# Patient Record
Sex: MALE | Race: BLACK OR AFRICAN AMERICAN | NOT HISPANIC OR LATINO | URBAN - METROPOLITAN AREA
[De-identification: names, ages, dates, MRNs, and addresses within clinical notes are randomized per-mention and may not be internally consistent; named-entity substitution may affect disease eponyms.]

---

## 2018-08-21 ENCOUNTER — OUTPATIENT (OUTPATIENT)
Dept: OUTPATIENT SERVICES | Facility: HOSPITAL | Age: 33
LOS: 1 days | End: 2018-08-21
Payer: COMMERCIAL

## 2018-08-21 ENCOUNTER — APPOINTMENT (OUTPATIENT)
Dept: FAMILY MEDICINE | Facility: HOSPITAL | Age: 33
End: 2018-08-21
Payer: COMMERCIAL

## 2018-08-21 DIAGNOSIS — Z83.3 FAMILY HISTORY OF DIABETES MELLITUS: ICD-10-CM

## 2018-08-21 DIAGNOSIS — F17.210 NICOTINE DEPENDENCE, CIGARETTES, UNCOMPLICATED: ICD-10-CM

## 2018-08-21 DIAGNOSIS — Z78.9 OTHER SPECIFIED HEALTH STATUS: ICD-10-CM

## 2018-08-21 DIAGNOSIS — Z87.891 PERSONAL HISTORY OF NICOTINE DEPENDENCE: ICD-10-CM
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
## 2018-08-21 DIAGNOSIS — F17.200 NICOTINE DEPENDENCE, UNSPECIFIED, UNCOMPLICATED: ICD-10-CM

## 2018-08-21 DIAGNOSIS — M25.511 PAIN IN RIGHT SHOULDER: ICD-10-CM

## 2018-08-21 DIAGNOSIS — Z00.00 ENCOUNTER FOR GENERAL ADULT MEDICAL EXAMINATION WITHOUT ABNORMAL FINDINGS: ICD-10-CM

## 2018-08-21 RX ORDER — NAPROXEN 500 MG/1
500 TABLET ORAL
Qty: 20 | Refills: 0 | Status: ACTIVE | COMMUNITY
Start: 2018-08-21 | End: 1900-01-01

## 2018-08-21 RX ORDER — CYCLOBENZAPRINE HYDROCHLORIDE 5 MG/1
5 TABLET, FILM COATED ORAL
Qty: 10 | Refills: 0 | Status: ACTIVE | COMMUNITY
Start: 2018-08-21 | End: 1900-01-01

## 2018-08-21 NOTE — PHYSICAL EXAM
[No Acute Distress] : no acute distress [Well Nourished] : well nourished [Normal Outer Ear/Nose] : the outer ears and nose were normal in appearance [No JVD] : no jugular venous distention [Supple] : supple [No Respiratory Distress] : no respiratory distress  [Clear to Auscultation] : lungs were clear to auscultation bilaterally [No Accessory Muscle Use] : no accessory muscle use [Normal Rate] : normal rate  [Regular Rhythm] : with a regular rhythm [Normal S1, S2] : normal S1 and S2 [No Murmur] : no murmur heard [Pedal Pulses Present] : the pedal pulses are present [No Edema] : there was no peripheral edema [Soft] : abdomen soft [Non Tender] : non-tender [Non-distended] : non-distended [No Masses] : no abdominal mass palpated [No HSM] : no HSM [Normal Bowel Sounds] : normal bowel sounds [No CVA Tenderness] : no CVA  tenderness [No Spinal Tenderness] : no spinal tenderness [No Rash] : no rash [Normal Gait] : normal gait [Normal Affect] : the affect was normal [Normal Insight/Judgement] : insight and judgment were intact [de-identified] : Anterior right shoulder tenderness over coracoid process. Tenderness at insertion point of right deltoid and over scapula. Full range of motion (however with pain at extreme ranges). Normal strength, normal upper extremity pulses and capillary refill. Otherwise normal musculoskeletal exam.
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
## 2018-08-21 NOTE — HEALTH RISK ASSESSMENT
[] : Yes [No falls in past year] : Patient reported no falls in the past year [0] : 2) Feeling down, depressed, or hopeless: Not at all (0)
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
## 2018-08-22 DIAGNOSIS — M25.511 PAIN IN RIGHT SHOULDER: ICD-10-CM

## 2018-08-23 ENCOUNTER — FORM ENCOUNTER (OUTPATIENT)
Age: 33
End: 2018-08-23

## 2018-08-24 PROCEDURE — 83036 HEMOGLOBIN GLYCOSYLATED A1C: CPT

## 2018-08-24 PROCEDURE — G0463: CPT

## 2018-08-24 PROCEDURE — 73030 X-RAY EXAM OF SHOULDER: CPT

## 2018-08-24 PROCEDURE — 80048 BASIC METABOLIC PNL TOTAL CA: CPT

## 2018-08-24 PROCEDURE — 87389 HIV-1 AG W/HIV-1&-2 AB AG IA: CPT

## 2018-08-24 PROCEDURE — 73030 X-RAY EXAM OF SHOULDER: CPT | Mod: 26,RT

## 2018-08-27 LAB
ANION GAP SERPL CALC-SCNC: 14 MMOL/L
BASOPHILS # BLD AUTO: 0.04 K/UL
BASOPHILS NFR BLD AUTO: 0.4 %
BUN SERPL-MCNC: 12 MG/DL
CALCIUM SERPL-MCNC: 10.5 MG/DL
CHLORIDE SERPL-SCNC: 102 MMOL/L
CO2 SERPL-SCNC: 24 MMOL/L
CREAT SERPL-MCNC: 1.23 MG/DL
EOSINOPHIL # BLD AUTO: 0.15 K/UL
EOSINOPHIL NFR BLD AUTO: 1.7 %
GLUCOSE SERPL-MCNC: 82 MG/DL
HBA1C MFR BLD HPLC: 5.7 %
HCT VFR BLD CALC: 46.7 %
HGB BLD-MCNC: 14.9 G/DL
HIV1+2 AB SPEC QL IA.RAPID: NONREACTIVE
IMM GRANULOCYTES NFR BLD AUTO: 0.2 %
LYMPHOCYTES # BLD AUTO: 2.17 K/UL
LYMPHOCYTES NFR BLD AUTO: 24.3 %
MAN DIFF?: NORMAL
MCHC RBC-ENTMCNC: 28.9 PG
MCHC RBC-ENTMCNC: 31.9 GM/DL
MCV RBC AUTO: 90.5 FL
MONOCYTES # BLD AUTO: 0.8 K/UL
MONOCYTES NFR BLD AUTO: 8.9 %
NEUTROPHILS # BLD AUTO: 5.76 K/UL
NEUTROPHILS NFR BLD AUTO: 64.5 %
PLATELET # BLD AUTO: 318 K/UL
POTASSIUM SERPL-SCNC: 4.3 MMOL/L
RBC # BLD: 5.16 M/UL
RBC # FLD: 14 %
SODIUM SERPL-SCNC: 140 MMOL/L
WBC # FLD AUTO: 8.94 K/UL

## 2019-01-16 ENCOUNTER — OUTPATIENT (OUTPATIENT)
Dept: OUTPATIENT SERVICES | Facility: HOSPITAL | Age: 34
LOS: 1 days | End: 2019-01-16
Payer: COMMERCIAL

## 2019-01-16 ENCOUNTER — APPOINTMENT (OUTPATIENT)
Dept: FAMILY MEDICINE | Facility: HOSPITAL | Age: 34
End: 2019-01-16

## 2019-01-16 VITALS
HEART RATE: 69 BPM | OXYGEN SATURATION: 98 % | WEIGHT: 179 LBS | TEMPERATURE: 97.5 F | RESPIRATION RATE: 16 BRPM | HEIGHT: 75.5 IN | SYSTOLIC BLOOD PRESSURE: 129 MMHG | BODY MASS INDEX: 22.02 KG/M2 | DIASTOLIC BLOOD PRESSURE: 85 MMHG

## 2019-01-16 DIAGNOSIS — Z00.00 ENCOUNTER FOR GENERAL ADULT MEDICAL EXAMINATION WITHOUT ABNORMAL FINDINGS: ICD-10-CM

## 2019-01-16 DIAGNOSIS — H61.23 IMPACTED CERUMEN, BILATERAL: ICD-10-CM

## 2019-01-16 DIAGNOSIS — Z87.09 PERSONAL HISTORY OF OTHER DISEASES OF THE RESPIRATORY SYSTEM: ICD-10-CM

## 2019-01-16 PROCEDURE — G0463: CPT

## 2019-01-16 RX ORDER — ASPIRIN 81 MG
6.5 TABLET, DELAYED RELEASE (ENTERIC COATED) ORAL
Qty: 1 | Refills: 0 | Status: ACTIVE | COMMUNITY
Start: 2019-01-16 | End: 1900-01-01

## 2019-01-16 NOTE — HISTORY OF PRESENT ILLNESS
[FreeTextEntry8] : 33M complaining of ear/throat pain. Sick contact about 2 weeks ago. ~1 week ago started with a non-productive cough, followed by b/l ear pain. + mucus production mostly clear, now throat starting to hurt. Denies fever/chills. Reports a smoothie he drank earlier made him feel better. Pt took Airborne last week and this week.

## 2019-01-16 NOTE — PHYSICAL EXAM
[No Acute Distress] : no acute distress [Well Nourished] : well nourished [Well Developed] : well developed [Well-Appearing] : well-appearing [Normal Outer Ear/Nose] : the outer ears and nose were normal in appearance [Normal Oropharynx] : the oropharynx was normal [Normal Nasal Mucosa] : the nasal mucosa was normal [No Lymphadenopathy] : no lymphadenopathy [Thyroid Normal, No Nodules] : the thyroid was normal and there were no nodules present [No Respiratory Distress] : no respiratory distress  [Clear to Auscultation] : lungs were clear to auscultation bilaterally [No Accessory Muscle Use] : no accessory muscle use [Normal Rate] : normal rate  [Regular Rhythm] : with a regular rhythm [Normal S1, S2] : normal S1 and S2 [No Murmur] : no murmur heard [de-identified] : + extensive impacted cerumen b/l

## 2019-01-16 NOTE — ASSESSMENT
[FreeTextEntry1] : 33M comes in for URI.\par \par 1. URI\par Symptomatic treatment - drink tea w/ honey, chicken soup, take theraflu PRN\par \par 2. Impacted cerumen b/l\par Given debrox drops \par \par RTC in 10-14 days for cerumen disimpaction\par \par Discussed w/ Dr. Falcon

## 2019-01-17 DIAGNOSIS — H61.23 IMPACTED CERUMEN, BILATERAL: ICD-10-CM

## 2019-04-02 ENCOUNTER — TRANSCRIPTION ENCOUNTER (OUTPATIENT)
Age: 34
End: 2019-04-02

## 2019-04-09 ENCOUNTER — TRANSCRIPTION ENCOUNTER (OUTPATIENT)
Age: 34
End: 2019-04-09

## 2019-07-19 ENCOUNTER — EMERGENCY (EMERGENCY)
Facility: HOSPITAL | Age: 34
LOS: 1 days | Discharge: HOME | End: 2019-07-19
Attending: EMERGENCY MEDICINE | Admitting: EMERGENCY MEDICINE
Payer: SELF-PAY

## 2019-07-19 VITALS
TEMPERATURE: 100 F | DIASTOLIC BLOOD PRESSURE: 70 MMHG | OXYGEN SATURATION: 99 % | WEIGHT: 184.97 LBS | RESPIRATION RATE: 17 BRPM | SYSTOLIC BLOOD PRESSURE: 110 MMHG | HEART RATE: 99 BPM

## 2019-07-19 PROCEDURE — 73620 X-RAY EXAM OF FOOT: CPT | Mod: 26,LT

## 2019-07-19 PROCEDURE — 99283 EMERGENCY DEPT VISIT LOW MDM: CPT

## 2019-07-19 PROCEDURE — 73610 X-RAY EXAM OF ANKLE: CPT | Mod: 26,LT

## 2019-07-19 PROCEDURE — 73620 X-RAY EXAM OF FOOT: CPT

## 2019-07-19 PROCEDURE — 73610 X-RAY EXAM OF ANKLE: CPT

## 2019-07-19 PROCEDURE — 99284 EMERGENCY DEPT VISIT MOD MDM: CPT

## 2019-07-19 RX ORDER — KETOROLAC TROMETHAMINE 30 MG/ML
60 SYRINGE (ML) INJECTION ONCE
Refills: 0 | Status: DISCONTINUED | OUTPATIENT
Start: 2019-07-19 | End: 2019-07-19

## 2019-07-19 RX ADMIN — Medication 500 MILLIGRAM(S): at 22:56

## 2019-07-19 NOTE — ED ADULT NURSE NOTE - OBJECTIVE STATEMENT
pt is a 35 y/o male fell on a ladder   hurt his left ankle . pt denies any head trauma . nor LOC. pt states pain is worse  when he put weight on his foot. + pedal pulses with limited ROM

## 2019-07-19 NOTE — ED PROVIDER NOTE - MUSCULOSKELETAL, MLM
Spine appears normal, range of motion is not limited, no muscle + joint tenderness over lateral malleolus with increase in tenderness on inversion; no pain on axial load

## 2019-07-19 NOTE — ED PROVIDER NOTE - CLINICAL SUMMARY MEDICAL DECISION MAKING FREE TEXT BOX
fall off of ladder isolated ankle injury prelim read of films negative d/c with ortho followup RTED PRN fall off of ladder isolated ankle injury wet read of films negative d/c with ortho followup and notice that although films appear negative all films get reread in AM; soheila Bazan otho followup;  RTED PRN

## 2019-07-19 NOTE — ED ADULT NURSE NOTE - NSIMPLEMENTINTERV_GEN_ALL_ED
Implemented All Universal Safety Interventions:  [place] to call system. Call bell, personal items and telephone within reach. Instruct patient to call for assistance. Room bathroom lighting operational. Non-slip footwear when patient is off stretcher. Physically safe environment: no spills, clutter or unnecessary equipment. Stretcher in lowest position, wheels locked, appropriate side rails in place.

## 2020-12-21 PROBLEM — Z87.09 HISTORY OF UPPER RESPIRATORY INFECTION: Status: RESOLVED | Noted: 2019-01-16 | Resolved: 2020-12-21

## 2021-01-11 PROBLEM — Z78.9 OTHER SPECIFIED HEALTH STATUS: Chronic | Status: ACTIVE | Noted: 2019-07-20

## 2021-01-12 ENCOUNTER — OUTPATIENT (OUTPATIENT)
Dept: OUTPATIENT SERVICES | Facility: HOSPITAL | Age: 36
LOS: 1 days | End: 2021-01-12
Payer: COMMERCIAL

## 2021-01-12 ENCOUNTER — APPOINTMENT (OUTPATIENT)
Dept: RADIOLOGY | Facility: HOSPITAL | Age: 36
End: 2021-01-12
Payer: COMMERCIAL

## 2021-01-12 DIAGNOSIS — Z00.8 ENCOUNTER FOR OTHER GENERAL EXAMINATION: ICD-10-CM

## 2021-01-12 PROCEDURE — 72100 X-RAY EXAM L-S SPINE 2/3 VWS: CPT | Mod: 26

## 2021-01-12 PROCEDURE — 73562 X-RAY EXAM OF KNEE 3: CPT | Mod: 26,LT,76

## 2021-01-12 PROCEDURE — 72040 X-RAY EXAM NECK SPINE 2-3 VW: CPT | Mod: 26

## 2021-01-12 PROCEDURE — 73562 X-RAY EXAM OF KNEE 3: CPT | Mod: 26,RT

## 2021-01-12 PROCEDURE — 73562 X-RAY EXAM OF KNEE 3: CPT

## 2021-01-12 PROCEDURE — 72100 X-RAY EXAM L-S SPINE 2/3 VWS: CPT

## 2021-01-12 PROCEDURE — 72040 X-RAY EXAM NECK SPINE 2-3 VW: CPT

## 2021-02-04 ENCOUNTER — APPOINTMENT (OUTPATIENT)
Dept: ORTHOPEDIC SURGERY | Facility: CLINIC | Age: 36
End: 2021-02-04
Payer: COMMERCIAL

## 2021-02-04 VITALS — BODY MASS INDEX: 23.62 KG/M2 | HEIGHT: 77 IN | WEIGHT: 200 LBS

## 2021-02-04 DIAGNOSIS — S46.011A STRAIN OF MUSCLE(S) AND TENDON(S) OF THE ROTATOR CUFF OF RIGHT SHOULDER, INITIAL ENCOUNTER: ICD-10-CM

## 2021-02-04 PROCEDURE — 99072 ADDL SUPL MATRL&STAF TM PHE: CPT

## 2021-02-04 PROCEDURE — 99204 OFFICE O/P NEW MOD 45 MIN: CPT

## 2021-02-04 RX ORDER — METHOCARBAMOL 750 MG/1
750 TABLET, FILM COATED ORAL
Qty: 15 | Refills: 0 | Status: ACTIVE | COMMUNITY
Start: 2020-10-25

## 2021-02-04 RX ORDER — IBUPROFEN 600 MG/1
600 TABLET, FILM COATED ORAL
Qty: 30 | Refills: 0 | Status: ACTIVE | COMMUNITY
Start: 2020-10-25

## 2021-02-04 NOTE — DISCUSSION/SUMMARY
[de-identified] : The underlying pathophysiology was reviewed in great detail with the patient as well as the various treatment options, including ice, analgesics, NSAIDs, Physical therapy, steroid injections, PRP, surgical intervention. \par \par \par MRI of the right shoulder reviewed and discussed in great detail today. \par \par Continue physical therapy as prescribed.  \par \par A home exercise sheet was given and discussed with the patient to follow.A Thera-Band was provided for exercise program. \par \par Activity modifications and restrictions were discussed. I advised avoiding overhead lifting. I advised the patient to work on good posture.\par \par FU 6 weeks. \par \par All questions were answered, all alternatives discussed and the patient is in complete agreement with that plan. Follow-up appointment as instructed. Any issues and the patient will call or come in sooner.
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
## 2021-02-04 NOTE — PHYSICAL EXAM
[de-identified] : Right Upper Extremity\par o Shoulder :\par ¦ Inspection/Palpation : moderate tenderness over the greater tuberosity, no acromioclavicular joint tenderness, no tenderness anterior and posterior glenohumeral joint,no swelling, no deformities\par ¦ Range of Motion : ACTIVE FORWARD ELEVATION: Measured at 160 degrees, ACTIVE EXTERNAL ROTATION: Measured at 80 degrees, ACTIVE INTERNAL ROTATION: Measured at T8 all with mild pain. \par ¦ Strength : external rotation 5/5, internal rotation 5/5, supraspinatus 5/5\par ¦ Stability : no joint instability on provocative testing\par ¦ Tests/Signs : Neer (-), Mcclure (+)\par o Upper Arm : no tenderness, no swelling, no deformities\par o Muscle Bulk : no atrophy\par o Sensation : sensation intact to light touch\par o Skin : no skin rash or discoloration\par o Vascular Exam : no edema, no cyanosis, radial and ulnar pulses normal\par \par Left Upper Extremity\par o Shoulder :\par ¦ Inspection/Palpation : no tenderness over the greater tuberosity, no acromioclavicular joint tenderness, no tenderness anterior and posterior glenohumeral joint,no swelling, no deformities\par ¦ Range of Motion : ACTIVE FORWARD ELEVATION: Measured at 160 degrees, ACTIVE EXTERNAL ROTATION: Measured at 80 degrees, ACTIVE INTERNAL ROTATION: Measured at T8\par ¦ Strength : external rotation 5/5, internal rotation 5/5, supraspinatus 5/5\par ¦ Stability : no joint instability on provocative testing\par ¦ Tests/Signs : Neer (-), Mcclure (-)\par o Upper Arm : no tenderness, no swelling, no deformities\par o Muscle Bulk : no atrophy\par o Sensation : sensation intact to light touch\par o Skin : no skin rash or discoloration\par o Vascular Exam : no edema, no cyanosis, radial and ulnar pulses normal [de-identified] : Patient comes to today's visit with outside imaging already performed. I reviewed the images in detail with the patient and discussed the findings as highlighted below.\par \par o Xray of the cervical spine performed on 01/12/2021 at [facility]: Impression: \par ¦  Straightening of the cervical lordosis is noted. \par \par \par o MRI of the right shoulder performed on 01/23/2021 at 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 \par ¦ Mild AC joint arthrosis. Thickening of the coracoacromial ligament predisposes the patient to external impingement.

## 2021-03-18 ENCOUNTER — APPOINTMENT (OUTPATIENT)
Dept: ORTHOPEDIC SURGERY | Facility: CLINIC | Age: 36
End: 2021-03-18

## 2022-07-11 ENCOUNTER — APPOINTMENT (OUTPATIENT)
Dept: FAMILY MEDICINE | Facility: HOSPITAL | Age: 37
End: 2022-07-11

## 2022-07-12 ENCOUNTER — NON-APPOINTMENT (OUTPATIENT)
Age: 37
End: 2022-07-12

## 2024-07-19 ENCOUNTER — EMERGENCY (EMERGENCY)
Facility: HOSPITAL | Age: 39
LOS: 1 days | Discharge: HOME | End: 2024-07-19
Attending: INTERNAL MEDICINE | Admitting: INTERNAL MEDICINE
Payer: MEDICAID

## 2024-07-19 VITALS
HEIGHT: 77 IN | WEIGHT: 166.01 LBS | DIASTOLIC BLOOD PRESSURE: 75 MMHG | HEART RATE: 70 BPM | TEMPERATURE: 97 F | OXYGEN SATURATION: 99 % | SYSTOLIC BLOOD PRESSURE: 121 MMHG | RESPIRATION RATE: 15 BRPM

## 2024-07-19 PROCEDURE — 99284 EMERGENCY DEPT VISIT MOD MDM: CPT

## 2024-07-19 PROCEDURE — 99283 EMERGENCY DEPT VISIT LOW MDM: CPT

## 2024-07-19 RX ORDER — LIDOCAINE HCL 28 MG/G
1 GEL TOPICAL ONCE
Refills: 0 | Status: COMPLETED | OUTPATIENT
Start: 2024-07-19 | End: 2024-07-19

## 2024-07-19 RX ORDER — LIDOCAINE HCL 28 MG/G
1 GEL TOPICAL
Qty: 2 | Refills: 0
Start: 2024-07-19 | End: 2024-07-28

## 2024-07-19 RX ADMIN — LIDOCAINE HCL 1 PATCH: 28 GEL TOPICAL at 15:03

## 2024-07-19 RX ADMIN — Medication 600 MILLIGRAM(S): at 15:03

## 2024-07-19 RX ADMIN — Medication 10 MILLIGRAM(S): at 15:03

## 2024-07-19 NOTE — ED PROVIDER NOTE - OBJECTIVE STATEMENT
39 year old male came to the emergency room with a chief complaint of right lower back pain which started from lifting heavy stuff no weakness or numbness in the legs no problem holding stool or urine no problem with ambulation No fall no abdominal pain no urinary complaints

## 2024-07-19 NOTE — ED PROVIDER NOTE - PATIENT PORTAL LINK FT
You can access the FollowMyHealth Patient Portal offered by [facility] by registering at the following website: [email or web address]. By joining [employer]’s FollowMyHealth portal, you will also be able to view your health information using other applications (apps) compatible with our system.

## 2024-07-19 NOTE — ED PROVIDER NOTE - NSFOLLOWUPINSTRUCTIONS_ED_ALL_ED_FT
Follow up with your PMD within 1-2 days.  Rest, increase your fluids, advance your activity as tolerated.   Take all of your other medications as previously prescribed.   Worsening, continued or ANY new concerning symptoms return to the emergency department.  ice rest meds as prescribed

## 2024-07-19 NOTE — ED PROVIDER NOTE - CLINICAL SUMMARY MEDICAL DECISION MAKING FREE TEXT BOX
acute R paraspinal low back pain no injury may be moving heavy stuff yesterday acute R paraspinal low back pain no injury may be moving heavy stuff yesterday  rx with meds with relief

## 2024-07-19 NOTE — ED PROVIDER NOTE - PHYSICAL EXAMINATION
General:     NAD, well-nourished, well-appearing  Head:     NC/AT, EOMI, oral mucosa moist  Neck:     trachea midline  Lungs:     CTA b/l, no w/r/r  CVS:     S1S2, RRR, no m/g/r  Abd:     +BS, s/nt/nd, no organomegaly  Ext:    2+ radial and pedal pulses, no c/c/e  Neuro: AAOx3, no sensory/motor deficits  msk - R paraspinal lumbar tenderness, neg SLR neuro vs R LL intact